# Patient Record
Sex: MALE | Race: WHITE | Employment: OTHER | URBAN - METROPOLITAN AREA
[De-identification: names, ages, dates, MRNs, and addresses within clinical notes are randomized per-mention and may not be internally consistent; named-entity substitution may affect disease eponyms.]

---

## 2017-01-12 PROBLEM — E78.00 HYPERCHOLESTEREMIA: Status: ACTIVE | Noted: 2017-01-12

## 2017-06-27 PROBLEM — R81 GLUCOSURIA: Status: ACTIVE | Noted: 2017-06-27

## 2017-06-27 PROBLEM — Z87.448 HISTORY OF HEMATURIA: Status: ACTIVE | Noted: 2017-06-27

## 2017-06-27 PROCEDURE — 81015 MICROSCOPIC EXAM OF URINE: CPT | Performed by: UROLOGY

## 2017-11-08 PROCEDURE — 81001 URINALYSIS AUTO W/SCOPE: CPT | Performed by: INTERNAL MEDICINE

## 2017-11-13 PROBLEM — Z87.448 HISTORY OF HEMATURIA: Status: RESOLVED | Noted: 2017-06-27 | Resolved: 2017-11-13

## 2017-12-05 PROBLEM — M65.322 TRIGGER INDEX FINGER OF LEFT HAND: Status: ACTIVE | Noted: 2017-12-05

## 2018-06-28 PROCEDURE — 81015 MICROSCOPIC EXAM OF URINE: CPT | Performed by: OBSTETRICS & GYNECOLOGY

## 2018-08-01 PROBLEM — Z79.01 ANTICOAGULANT LONG-TERM USE: Status: ACTIVE | Noted: 2018-08-01

## 2019-02-15 ENCOUNTER — HOSPITAL ENCOUNTER (EMERGENCY)
Facility: HOSPITAL | Age: 74
Discharge: HOME | End: 2019-02-15
Attending: EMERGENCY MEDICINE
Payer: MEDICARE

## 2019-02-15 ENCOUNTER — APPOINTMENT (OUTPATIENT)
Dept: GENERAL RADIOLOGY | Facility: HOSPITAL | Age: 74
End: 2019-02-15
Attending: EMERGENCY MEDICINE
Payer: MEDICARE

## 2019-02-15 VITALS
BODY MASS INDEX: 31.5 KG/M2 | OXYGEN SATURATION: 97 % | HEIGHT: 71 IN | DIASTOLIC BLOOD PRESSURE: 75 MMHG | HEART RATE: 65 BPM | SYSTOLIC BLOOD PRESSURE: 136 MMHG | TEMPERATURE: 98 F | WEIGHT: 225 LBS | RESPIRATION RATE: 16 BRPM

## 2019-02-15 DIAGNOSIS — M25.561 ACUTE PAIN OF RIGHT KNEE: Primary | ICD-10-CM

## 2019-02-15 PROCEDURE — 99283 EMERGENCY DEPT VISIT LOW MDM: CPT

## 2019-02-15 PROCEDURE — 73590 X-RAY EXAM OF LOWER LEG: CPT | Performed by: EMERGENCY MEDICINE

## 2019-02-15 NOTE — ED PROVIDER NOTES
Patient Seen in: [facility] Emergency Department    History   Patient presents with:  Lower Extremity Injury (musculoskeletal)    Stated Complaint: right knee injury    HPI    81-year-old white male who presents to the emergency room today for compl use: No      Alcohol/week: 0.0 oz    Drug use: No      Review of Systems    Positive for stated complaint: right knee injury  Other systems are as noted in HPI. Constitutional and vital signs reviewed.       All other systems reviewed and negative except a worsen or new symptoms develop.             Disposition and Plan     Clinical Impression:  Acute pain of right knee  (primary encounter diagnosis)    Disposition:  Discharge  2/15/2019  1:48 pm    Follow-up:  Elena Cote, [facility]

## 2019-02-15 NOTE — ED NOTES
Patient remains updated with results and plan for discharge. Knee immobilizer to be placed by PCT at this time. Daughter sitting at bedside.

## 2019-02-19 ENCOUNTER — APPOINTMENT (OUTPATIENT)
Dept: LAB | Facility: HOSPITAL | Age: 74
End: 2019-02-19
Attending: ORTHOPAEDIC SURGERY
Payer: MEDICARE

## 2019-02-19 DIAGNOSIS — Z01.812 PRE-OPERATIVE LABORATORY EXAMINATION: ICD-10-CM

## 2019-02-19 DIAGNOSIS — Z01.810 PRE-OPERATIVE CARDIOVASCULAR EXAMINATION: ICD-10-CM

## 2019-02-19 LAB
ANION GAP SERPL CALC-SCNC: 9 MMOL/L (ref 0–18)
ATRIAL RATE: 75 BPM
BUN BLD-MCNC: 29 MG/DL (ref 7–18)
BUN/CREAT SERPL: 21.5 (ref 10–20)
CALCIUM BLD-MCNC: 9.7 MG/DL (ref 8.5–10.1)
CHLORIDE SERPL-SCNC: 106 MMOL/L (ref 98–107)
CO2 SERPL-SCNC: 29 MMOL/L (ref 21–32)
CREAT BLD-MCNC: 1.35 MG/DL (ref 0.7–1.3)
GLUCOSE BLD-MCNC: 105 MG/DL (ref 70–99)
OSMOLALITY SERPL CALC.SUM OF ELEC: 304 MOSM/KG (ref 275–295)
P AXIS: 90 DEGREES
P-R INTERVAL: 162 MS
POTASSIUM SERPL-SCNC: 3.6 MMOL/L (ref 3.5–5.1)
Q-T INTERVAL: 402 MS
QRS DURATION: 92 MS
QTC CALCULATION (BEZET): 448 MS
R AXIS: 47 DEGREES
SODIUM SERPL-SCNC: 144 MMOL/L (ref 136–145)
T AXIS: 34 DEGREES
VENTRICULAR RATE: 75 BPM

## 2019-02-19 PROCEDURE — 93005 ELECTROCARDIOGRAM TRACING: CPT

## 2019-02-19 PROCEDURE — 93010 ELECTROCARDIOGRAM REPORT: CPT | Performed by: INTERNAL MEDICINE

## 2019-02-19 PROCEDURE — 80048 BASIC METABOLIC PNL TOTAL CA: CPT

## 2019-02-19 PROCEDURE — 36415 COLL VENOUS BLD VENIPUNCTURE: CPT

## 2019-03-04 PROBLEM — S83.511D COMPLETE TEAR OF RIGHT ACL, SUBSEQUENT ENCOUNTER: Status: ACTIVE | Noted: 2019-03-04

## 2019-03-04 PROBLEM — S83.241D ACUTE MEDIAL MENISCUS TEAR OF RIGHT KNEE, SUBSEQUENT ENCOUNTER: Status: ACTIVE | Noted: 2019-03-04

## 2019-03-04 PROBLEM — S83.511D COMPLETE TEAR OF RIGHT ACL, SUBSEQUENT ENCOUNTER: Status: RESOLVED | Noted: 2019-03-04 | Resolved: 2019-03-04

## 2019-08-13 PROCEDURE — 81003 URINALYSIS AUTO W/O SCOPE: CPT | Performed by: UROLOGY

## 2019-08-26 ENCOUNTER — HOSPITAL ENCOUNTER (OUTPATIENT)
Dept: CARDIOLOGY CLINIC | Facility: HOSPITAL | Age: 74
Discharge: HOME | End: 2019-08-26
Attending: INTERNAL MEDICINE

## 2019-08-26 DIAGNOSIS — Z13.9 ENCOUNTER FOR SCREENING: ICD-10-CM

## 2019-08-29 NOTE — PROGRESS NOTES
[facility] sent with Results   Nothing further needed from MD or nurse. Closing encounter.

## 2019-09-24 PROCEDURE — 88173 CYTOPATH EVAL FNA REPORT: CPT | Performed by: OTOLARYNGOLOGY

## 2020-01-07 ENCOUNTER — ANESTHESIA EVENT (OUTPATIENT)
Dept: ENDOSCOPY | Facility: HOSPITAL | Age: 75
End: 2020-01-07
Payer: MEDICARE

## 2020-01-07 ENCOUNTER — ANESTHESIA (OUTPATIENT)
Dept: ENDOSCOPY | Facility: HOSPITAL | Age: 75
End: 2020-01-07
Payer: MEDICARE

## 2020-01-07 ENCOUNTER — HOSPITAL ENCOUNTER (OUTPATIENT)
Facility: HOSPITAL | Age: 75
Setting detail: HOSPITAL OUTPATIENT SURGERY
Discharge: HOME | End: 2020-01-07
Attending: INTERNAL MEDICINE | Admitting: INTERNAL MEDICINE
Payer: MEDICARE

## 2020-01-07 VITALS
WEIGHT: 230 LBS | DIASTOLIC BLOOD PRESSURE: 67 MMHG | TEMPERATURE: 98 F | HEIGHT: 72 IN | SYSTOLIC BLOOD PRESSURE: 140 MMHG | HEART RATE: 49 BPM | BODY MASS INDEX: 31.15 KG/M2 | RESPIRATION RATE: 18 BRPM | OXYGEN SATURATION: 100 %

## 2020-01-07 DIAGNOSIS — Z83.79 FAMILY HISTORY OF GERD: ICD-10-CM

## 2020-01-07 DIAGNOSIS — R07.89 CHEST TIGHTNESS: ICD-10-CM

## 2020-01-07 DIAGNOSIS — Z86.010 HISTORY OF COLON POLYPS: ICD-10-CM

## 2020-01-07 DIAGNOSIS — Z80.0 FAMILY HISTORY OF COLON CANCER IN FATHER: ICD-10-CM

## 2020-01-07 PROCEDURE — 0D758ZZ DILATION OF ESOPHAGUS, VIA NATURAL OR ARTIFICIAL OPENING ENDOSCOPIC: ICD-10-PCS | Performed by: INTERNAL MEDICINE

## 2020-01-07 PROCEDURE — 0DBK8ZX EXCISION OF ASCENDING COLON, VIA NATURAL OR ARTIFICIAL OPENING ENDOSCOPIC, DIAGNOSTIC: ICD-10-PCS | Performed by: INTERNAL MEDICINE

## 2020-01-07 PROCEDURE — 88305 TISSUE EXAM BY PATHOLOGIST: CPT | Performed by: INTERNAL MEDICINE

## 2020-01-07 PROCEDURE — 0DBL8ZX EXCISION OF TRANSVERSE COLON, VIA NATURAL OR ARTIFICIAL OPENING ENDOSCOPIC, DIAGNOSTIC: ICD-10-PCS | Performed by: INTERNAL MEDICINE

## 2020-01-07 PROCEDURE — 0DB58ZX EXCISION OF ESOPHAGUS, VIA NATURAL OR ARTIFICIAL OPENING ENDOSCOPIC, DIAGNOSTIC: ICD-10-PCS | Performed by: INTERNAL MEDICINE

## 2020-01-07 RX ORDER — METOCLOPRAMIDE HYDROCHLORIDE 5 MG/ML
10 INJECTION INTRAMUSCULAR; INTRAVENOUS AS NEEDED
Status: DISCONTINUED | OUTPATIENT
Start: 2020-01-07 | End: 2020-01-07

## 2020-01-07 RX ORDER — HYDROCODONE BITARTRATE AND ACETAMINOPHEN 5; 325 MG/1; MG/1
1 TABLET ORAL AS NEEDED
Status: DISCONTINUED | OUTPATIENT
Start: 2020-01-07 | End: 2020-01-07

## 2020-01-07 RX ORDER — ONDANSETRON 2 MG/ML
4 INJECTION INTRAMUSCULAR; INTRAVENOUS AS NEEDED
Status: DISCONTINUED | OUTPATIENT
Start: 2020-01-07 | End: 2020-01-07

## 2020-01-07 RX ORDER — MEPERIDINE HYDROCHLORIDE 25 MG/ML
12.5 INJECTION INTRAMUSCULAR; INTRAVENOUS; SUBCUTANEOUS AS NEEDED
Status: DISCONTINUED | OUTPATIENT
Start: 2020-01-07 | End: 2020-01-07

## 2020-01-07 RX ORDER — DIPHENHYDRAMINE HYDROCHLORIDE 50 MG/ML
12.5 INJECTION INTRAMUSCULAR; INTRAVENOUS AS NEEDED
Status: DISCONTINUED | OUTPATIENT
Start: 2020-01-07 | End: 2020-01-07

## 2020-01-07 RX ORDER — SODIUM CHLORIDE, SODIUM LACTATE, POTASSIUM CHLORIDE, CALCIUM CHLORIDE 600; 310; 30; 20 MG/100ML; MG/100ML; MG/100ML; MG/100ML
INJECTION, SOLUTION INTRAVENOUS CONTINUOUS
Status: DISCONTINUED | OUTPATIENT
Start: 2020-01-07 | End: 2020-01-07

## 2020-01-07 RX ORDER — MIDAZOLAM HYDROCHLORIDE 1 MG/ML
1 INJECTION INTRAMUSCULAR; INTRAVENOUS EVERY 5 MIN PRN
Status: DISCONTINUED | OUTPATIENT
Start: 2020-01-07 | End: 2020-01-07

## 2020-01-07 RX ORDER — HYDROCODONE BITARTRATE AND ACETAMINOPHEN 5; 325 MG/1; MG/1
2 TABLET ORAL AS NEEDED
Status: DISCONTINUED | OUTPATIENT
Start: 2020-01-07 | End: 2020-01-07

## 2020-01-07 RX ORDER — NALOXONE HYDROCHLORIDE 0.4 MG/ML
80 INJECTION, SOLUTION INTRAMUSCULAR; INTRAVENOUS; SUBCUTANEOUS AS NEEDED
Status: DISCONTINUED | OUTPATIENT
Start: 2020-01-07 | End: 2020-01-07

## 2020-01-07 RX ORDER — DEXAMETHASONE SODIUM PHOSPHATE 4 MG/ML
4 VIAL (ML) INJECTION AS NEEDED
Status: DISCONTINUED | OUTPATIENT
Start: 2020-01-07 | End: 2020-01-07

## 2020-01-07 RX ORDER — LABETALOL HYDROCHLORIDE 5 MG/ML
5 INJECTION, SOLUTION INTRAVENOUS EVERY 5 MIN PRN
Status: DISCONTINUED | OUTPATIENT
Start: 2020-01-07 | End: 2020-01-07

## 2020-01-07 RX ORDER — HYDROMORPHONE HYDROCHLORIDE 1 MG/ML
0.4 INJECTION, SOLUTION INTRAMUSCULAR; INTRAVENOUS; SUBCUTANEOUS EVERY 5 MIN PRN
Status: DISCONTINUED | OUTPATIENT
Start: 2020-01-07 | End: 2020-01-07

## 2020-01-07 RX ADMIN — SODIUM CHLORIDE, SODIUM LACTATE, POTASSIUM CHLORIDE, CALCIUM CHLORIDE: 600; 310; 30; 20 INJECTION, SOLUTION INTRAVENOUS at 15:52:00

## 2020-01-07 NOTE — ANESTHESIA POSTPROCEDURE EVALUATION
[facility]    Bill Going Patient Status:  Hospital Outpatient Surgery   Age/Gender 69year old male MRN [id number]   Location [address] [facility]. Attending Kristian Pollard MD   Hosp Day # 0 PCP Bijal Finch MD       Anesthesia Po

## 2020-01-07 NOTE — OPERATIVE REPORT
PATIENT NAME: Jose Guadalupe Patrick  MRN: [id number]  DATE OF OPERATION: 1/7/2020  PREOPERATIVE DIAGNOSIS:   1. Chest tightness, possible esophageal spasm, possible GERD or other esophageal dysmotility disorder  2. Family history of colon cancer.   POSTOPERATIV to be noncritical and was larger than the actual balloon. There was a 2 cm sliding hiatal hernia [place] grade 3. Random biopsies were taken from the esophagus to rule out eosinophilic esophagitis although no obvious signs of it seen.   Of note upon entering results at discharge. IMPRESSION:  1. As above    RECOMMENDATIONS:  1. Await final biopsy results. 2. Continue with PPI therapy. 3. Most likely colonoscopy in 5 years. 4. We may consider esophageal manometry for evaluation.     Mckenzie Santoyo MD  [facility]

## 2020-01-07 NOTE — ANESTHESIA PREPROCEDURE EVALUATION
PRE-OP EVALUATION    Patient Name: Lindsey Cesar    Pre-op Diagnosis: Family history of GERD [Z83.79]  Chest tightness [R07.89]  History of colon polyps [Z86.010]  Family history of colon cancer in father [Z80.0]    Procedure(s):  ESOPHAGOGASTRODUODE hypertension   (+) hyperlipidemia  (+) CAD             (+) dysrhythmias and atrial fibrillation                  Endo/Other    Negative endo/other ROS.                               Pulmonary                    (+) sleep apnea       Neuro/Psych    Negative 11/15/2019    RBC 4.66 11/15/2019    HGB 15.0 11/15/2019    HCT 42.7 11/15/2019    MCV 91.6 11/15/2019    MCH 32.2 11/15/2019    MCHC 35.1 11/15/2019    RDW 13.0 11/15/2019     (L) 11/15/2019     Lab Results   Component Value Date     11/15/20

## 2020-01-08 NOTE — PROGRESS NOTES
1/8/2020  [address]  [id number]    Dear Rodrigo Montiel,       Here are the biopsy/pathology findings from your recent EGD (upper endoscopy) and colonoscopy:     The biopsy/pathology findings from your upper endoscopy showed

## 2020-10-09 RX ORDER — METOPROLOL SUCCINATE 25 MG/1
25 TABLET, EXTENDED RELEASE ORAL EVERY EVENING
COMMUNITY
End: 2020-11-17

## 2020-10-10 ENCOUNTER — APPOINTMENT (OUTPATIENT)
Dept: LAB | Facility: HOSPITAL | Age: 75
End: 2020-10-10
Attending: INTERNAL MEDICINE
Payer: MEDICARE

## 2020-10-10 DIAGNOSIS — I25.10 CAD (CORONARY ARTERY DISEASE): ICD-10-CM

## 2020-10-13 ENCOUNTER — HOSPITAL ENCOUNTER (OUTPATIENT)
Dept: INTERVENTIONAL RADIOLOGY/VASCULAR | Facility: HOSPITAL | Age: 75
Discharge: HOME | End: 2020-10-13
Attending: INTERNAL MEDICINE | Admitting: INTERNAL MEDICINE
Payer: MEDICARE

## 2020-10-13 VITALS
OXYGEN SATURATION: 95 % | SYSTOLIC BLOOD PRESSURE: 101 MMHG | WEIGHT: 230 LBS | HEIGHT: 72 IN | RESPIRATION RATE: 19 BRPM | DIASTOLIC BLOOD PRESSURE: 49 MMHG | HEART RATE: 52 BPM | BODY MASS INDEX: 31.15 KG/M2

## 2020-10-13 DIAGNOSIS — I25.10 CAD (CORONARY ARTERY DISEASE): Primary | ICD-10-CM

## 2020-10-13 PROCEDURE — 4A023N7 MEASUREMENT OF CARDIAC SAMPLING AND PRESSURE, LEFT HEART, PERCUTANEOUS APPROACH: ICD-10-PCS | Performed by: INTERNAL MEDICINE

## 2020-10-13 PROCEDURE — 93458 L HRT ARTERY/VENTRICLE ANGIO: CPT

## 2020-10-13 PROCEDURE — B215YZZ FLUOROSCOPY OF LEFT HEART USING OTHER CONTRAST: ICD-10-PCS | Performed by: INTERNAL MEDICINE

## 2020-10-13 PROCEDURE — 99153 MOD SED SAME PHYS/QHP EA: CPT

## 2020-10-13 PROCEDURE — B211YZZ FLUOROSCOPY OF MULTIPLE CORONARY ARTERIES USING OTHER CONTRAST: ICD-10-PCS | Performed by: INTERNAL MEDICINE

## 2020-10-13 PROCEDURE — 99152 MOD SED SAME PHYS/QHP 5/>YRS: CPT

## 2020-10-13 RX ORDER — SODIUM CHLORIDE 9 MG/ML
INJECTION, SOLUTION INTRAVENOUS
Status: DISCONTINUED | OUTPATIENT
Start: 2020-10-14 | End: 2020-10-13 | Stop reason: HOSPADM

## 2020-10-13 RX ORDER — MIDAZOLAM HYDROCHLORIDE 1 MG/ML
INJECTION INTRAMUSCULAR; INTRAVENOUS
Status: COMPLETED
Start: 2020-10-13 | End: 2020-10-13

## 2020-10-13 RX ORDER — VERAPAMIL HYDROCHLORIDE 2.5 MG/ML
INJECTION, SOLUTION INTRAVENOUS
Status: COMPLETED
Start: 2020-10-13 | End: 2020-10-13

## 2020-10-13 RX ORDER — LIDOCAINE HYDROCHLORIDE 10 MG/ML
INJECTION, SOLUTION EPIDURAL; INFILTRATION; INTRACAUDAL; PERINEURAL
Status: COMPLETED
Start: 2020-10-13 | End: 2020-10-13

## 2020-10-13 RX ORDER — HEPARIN SODIUM 5000 [USP'U]/ML
INJECTION, SOLUTION INTRAVENOUS; SUBCUTANEOUS
Status: COMPLETED
Start: 2020-10-13 | End: 2020-10-13

## 2020-10-13 RX ORDER — NITROGLYCERIN 20 MG/100ML
INJECTION INTRAVENOUS
Status: COMPLETED
Start: 2020-10-13 | End: 2020-10-13

## 2020-10-13 NOTE — PROGRESS NOTES
Patient had LHC today with Dr. Bill Feliciano. Right wrist TR band in place with 15cc air instilled. Patient denies any numbness or tingling to right hand/fingers. Good O2 pleth and reading with probe on right hand. VSS. Wife @ bedside.  Dr. Bill Feliciano @ bedside post

## 2020-10-13 NOTE — PROGRESS NOTES
Postprocedure note  Left heart catheterization, left ventriculogram, bilateral selective coronary angiogram right radial site.     Preliminary report:   -Patent coronary arteries  -Patent LAD stents  -Normal left ventriculogram  -Normal hemodynamics  TR ban

## 2020-10-13 NOTE — PROCEDURES
Dorothy 44 Patient Status:  Outpatient in a Bed    1945 MRN [id number]   Location [address] [facility] Attending Phuc Mcknight MD   Hosp Day # 0 PCP Lennox Saliva, MD fluoroscopy into the ascending aorta. Selective right and left coronary angiography were accomplished. The catheter was removed over a J-tipped guidewire.       Then a 6 Icelandic pigtail catheter was loaded onto the J-tipped guidewire and advanced into the as artery calcification, proximal left coronary artery stents    Hemodynamics: Central aortic pressure 130/80 mean 90 LV pressure 130 with an LVEDP of 10    Left coronary Angiography:  Left main–1/2 cm length and normal  LAD–the LAD was a large vessel reachin

## 2020-10-13 NOTE — PROGRESS NOTES
Precath Note  Patient with history of CAD, multiple LAD stents with abnormal nuclear stress test and anterior apical distribution. Plan cath possible PTCA/stent.   Risks benefits and alternatives been shared with patient and wife who understand and wish to

## 2020-11-17 PROBLEM — S83.241D ACUTE MEDIAL MENISCUS TEAR OF RIGHT KNEE, SUBSEQUENT ENCOUNTER: Status: RESOLVED | Noted: 2019-03-04 | Resolved: 2020-11-17

## 2021-11-19 PROBLEM — D69.6 PLATELETS DECREASED (HCC): Status: ACTIVE | Noted: 2021-11-19

## (undated) DEVICE — SYRINGE/GUAGE ASSEMBLY

## (undated) DEVICE — FILTERLINE NASAL ADULT O2/CO2

## (undated) DEVICE — 1200CC GUARDIAN II: Brand: GUARDIAN

## (undated) DEVICE — CATH BALLOON CRE 18-20MM 5838

## (undated) DEVICE — ENDOSCOPY PACK - LOWER: Brand: MEDLINE INDUSTRIES, INC.

## (undated) DEVICE — TRAP 4 CPTR CHMBR N EZ INLN

## (undated) DEVICE — 3M™ RED DOT™ MONITORING ELECTRODE WITH FOAM TAPE AND STICKY GEL, 50/BAG, 20/CASE, 72/PLT 2570: Brand: RED DOT™

## (undated) DEVICE — ENDOSCOPY PACK UPPER: Brand: MEDLINE INDUSTRIES, INC.

## (undated) DEVICE — Device: Brand: DEFENDO AIR/WATER/SUCTION AND BIOPSY VALVE

## (undated) DEVICE — CAP SEALING, REVEAL 15.0MM

## (undated) DEVICE — FORCEP RADIAL JAW 4

## (undated) DEVICE — SNARE 9MM 230CM 2.4MM EXACTO

## (undated) NOTE — LETTER
[facility] [address], [address]  Consent for Procedure/Sedation    Date:        Time:       1.  I authorize the performance upon Shlomo Phoenix the following:cardiac catheterization, left ventricular cineangiography, bilater Signature of person authorized                                     Relationship to  to consent for patient: _________________________ patient: ___________________    Witness: _______________________________ Date: _____________________    Printed: 10/9/2020

## (undated) NOTE — ED AVS SNAPSHOT
Norma Owen   MRN: [id number]    Department:  [facility] Emergency Department   Date of Visit:  2/15/2019           Disclosure     Insurance plans vary and the physician(s) referred by the ER may not be covered by your plan.  Please contact tell this physician (or your personal doctor if your instructions are to return to your personal doctor) about any new or lasting problems. The primary care or specialist physician will see patients referred from the [facility] Emergency Department.  Crystal Burroughs

## (undated) NOTE — LETTER
1/8/2020          [address]  [id number]    Dear Nj Hernandez,       Here are the biopsy/pathology findings from your recent EGD (upper endoscopy) and colonoscopy:     The biopsy/pathology findings from your upper endo